# Patient Record
Sex: FEMALE | Race: BLACK OR AFRICAN AMERICAN | NOT HISPANIC OR LATINO | Employment: UNEMPLOYED | URBAN - METROPOLITAN AREA
[De-identification: names, ages, dates, MRNs, and addresses within clinical notes are randomized per-mention and may not be internally consistent; named-entity substitution may affect disease eponyms.]

---

## 2023-09-28 RX ORDER — DIPHENHYDRAMINE HCL 25 MG
1 TABLET ORAL EVERY 6 HOURS PRN
COMMUNITY

## 2023-09-28 RX ORDER — ATORVASTATIN CALCIUM 20 MG/1
1 TABLET, FILM COATED ORAL DAILY
COMMUNITY

## 2023-09-28 RX ORDER — OXYCODONE HYDROCHLORIDE 5 MG/1
1 TABLET ORAL EVERY 8 HOURS PRN
COMMUNITY

## 2023-09-28 RX ORDER — LIDOCAINE 50 MG/G
PATCH TOPICAL DAILY
COMMUNITY

## 2023-09-28 RX ORDER — AMOXICILLIN 250 MG
2 CAPSULE ORAL DAILY
COMMUNITY

## 2023-09-28 RX ORDER — CLOPIDOGREL BISULFATE 75 MG/1
1 TABLET ORAL DAILY
COMMUNITY

## 2023-09-28 RX ORDER — EPOETIN ALFA 10000 [IU]/ML
1 SOLUTION INTRAVENOUS; SUBCUTANEOUS
COMMUNITY

## 2023-09-28 RX ORDER — TERBUTALINE SULFATE 5 MG/1
1 TABLET ORAL 2 TIMES DAILY
COMMUNITY

## 2023-09-28 RX ORDER — BUDESONIDE 0.5 MG/2ML
2 INHALANT ORAL EVERY 12 HOURS
COMMUNITY

## 2023-09-28 RX ORDER — UBIDECARENONE 75 MG
1 CAPSULE ORAL DAILY
COMMUNITY

## 2023-09-28 RX ORDER — TRAZODONE HYDROCHLORIDE 50 MG/1
1.5 TABLET ORAL NIGHTLY
COMMUNITY

## 2023-09-28 RX ORDER — LORAZEPAM 1 MG/1
TABLET ORAL EVERY 6 HOURS PRN
COMMUNITY

## 2023-09-28 RX ORDER — IPRATROPIUM BROMIDE AND ALBUTEROL SULFATE 2.5; .5 MG/3ML; MG/3ML
3 SOLUTION RESPIRATORY (INHALATION) 4 TIMES DAILY
COMMUNITY

## 2023-09-28 RX ORDER — FUROSEMIDE 40 MG/1
1 TABLET ORAL DAILY
COMMUNITY

## 2023-09-28 RX ORDER — ALUMINUM HYDROXIDE, MAGNESIUM HYDROXIDE, AND SIMETHICONE 1200; 120; 1200 MG/30ML; MG/30ML; MG/30ML
30 SUSPENSION ORAL EVERY 6 HOURS PRN
COMMUNITY
Start: 2022-12-23

## 2023-09-28 RX ORDER — LEVETIRACETAM 1000 MG/1
1 TABLET ORAL 2 TIMES DAILY
COMMUNITY

## 2023-09-28 RX ORDER — HYDROXYZINE HYDROCHLORIDE 25 MG/1
1 TABLET, FILM COATED ORAL EVERY 6 HOURS PRN
COMMUNITY

## 2023-09-28 RX ORDER — LEVETIRACETAM 750 MG/1
1 TABLET ORAL 2 TIMES DAILY
COMMUNITY
Start: 2022-12-23

## 2023-09-28 RX ORDER — FAMOTIDINE 20 MG/1
1 TABLET, FILM COATED ORAL DAILY
COMMUNITY

## 2023-09-28 RX ORDER — ERGOCALCIFEROL 1.25 MG/1
1 CAPSULE ORAL
COMMUNITY

## 2023-09-28 RX ORDER — ESCITALOPRAM OXALATE 20 MG/1
1 TABLET ORAL DAILY
COMMUNITY
Start: 2022-12-23

## 2023-09-28 RX ORDER — DOCUSATE SODIUM 100 MG/1
1 CAPSULE, LIQUID FILLED ORAL 2 TIMES DAILY
COMMUNITY

## 2023-09-28 RX ORDER — METHOCARBAMOL 500 MG/1
1 TABLET, FILM COATED ORAL 3 TIMES DAILY
COMMUNITY

## 2023-09-28 RX ORDER — GABAPENTIN 300 MG/1
1 CAPSULE ORAL 3 TIMES DAILY
COMMUNITY

## 2023-09-28 RX ORDER — LISINOPRIL 20 MG/1
1 TABLET ORAL DAILY
COMMUNITY

## 2023-09-28 RX ORDER — CLONIDINE HYDROCHLORIDE 0.2 MG/1
1 TABLET ORAL EVERY 8 HOURS PRN
COMMUNITY

## 2023-09-28 RX ORDER — MECLIZINE HCL 12.5 MG 12.5 MG/1
1 TABLET ORAL 3 TIMES DAILY PRN
COMMUNITY
Start: 2022-12-23

## 2023-09-28 RX ORDER — CHLORHEXIDINE GLUCONATE ORAL RINSE 1.2 MG/ML
15 SOLUTION DENTAL 2 TIMES DAILY
COMMUNITY

## 2023-09-28 RX ORDER — POLYETHYLENE GLYCOL 3350 17 G/17G
17 POWDER, FOR SOLUTION ORAL DAILY PRN
COMMUNITY

## 2023-09-28 RX ORDER — GUAIFENESIN 600 MG/1
1 TABLET, EXTENDED RELEASE ORAL EVERY 12 HOURS PRN
COMMUNITY

## 2023-09-28 RX ORDER — IBUPROFEN 600 MG/1
1 TABLET ORAL EVERY 6 HOURS PRN
COMMUNITY

## 2023-09-28 RX ORDER — MIDODRINE HYDROCHLORIDE 5 MG/1
1 TABLET ORAL 3 TIMES DAILY
COMMUNITY
Start: 2022-12-23

## 2023-09-28 RX ORDER — ACETAMINOPHEN 325 MG/1
2 TABLET ORAL EVERY 6 HOURS PRN
COMMUNITY
Start: 2022-12-23

## 2023-10-04 ENCOUNTER — APPOINTMENT (OUTPATIENT)
Dept: PULMONOLOGY | Facility: HOSPITAL | Age: 48
End: 2023-10-04
Payer: COMMERCIAL

## 2024-02-23 ENCOUNTER — APPOINTMENT (OUTPATIENT)
Dept: CARDIOLOGY | Facility: HOSPITAL | Age: 49
End: 2024-02-23
Payer: COMMERCIAL

## 2024-02-23 ENCOUNTER — APPOINTMENT (OUTPATIENT)
Dept: RADIOLOGY | Facility: HOSPITAL | Age: 49
End: 2024-02-23
Payer: COMMERCIAL

## 2024-02-23 ENCOUNTER — HOSPITAL ENCOUNTER (EMERGENCY)
Facility: HOSPITAL | Age: 49
Discharge: HOME | End: 2024-02-23
Attending: EMERGENCY MEDICINE
Payer: COMMERCIAL

## 2024-02-23 VITALS
DIASTOLIC BLOOD PRESSURE: 94 MMHG | OXYGEN SATURATION: 98 % | HEART RATE: 74 BPM | RESPIRATION RATE: 18 BRPM | HEIGHT: 67 IN | SYSTOLIC BLOOD PRESSURE: 164 MMHG | TEMPERATURE: 98.8 F | BODY MASS INDEX: 45.99 KG/M2 | WEIGHT: 293 LBS

## 2024-02-23 DIAGNOSIS — I47.10 SVT (SUPRAVENTRICULAR TACHYCARDIA) (CMS-HCC): Primary | ICD-10-CM

## 2024-02-23 LAB
ALBUMIN SERPL BCP-MCNC: 3.4 G/DL (ref 3.4–5)
ALP SERPL-CCNC: 55 U/L (ref 33–110)
ALT SERPL W P-5'-P-CCNC: 5 U/L (ref 7–45)
ANION GAP BLDV CALCULATED.4IONS-SCNC: 4 MMOL/L (ref 10–25)
ANION GAP SERPL CALC-SCNC: 12 MMOL/L (ref 10–20)
APTT PPP: 37 SECONDS (ref 27–38)
AST SERPL W P-5'-P-CCNC: 11 U/L (ref 9–39)
BASE EXCESS BLDV CALC-SCNC: 9.4 MMOL/L (ref -2–3)
BASOPHILS # BLD AUTO: 0.03 X10*3/UL (ref 0–0.1)
BASOPHILS NFR BLD AUTO: 0.4 %
BILIRUB SERPL-MCNC: 0.4 MG/DL (ref 0–1.2)
BNP SERPL-MCNC: 59 PG/ML (ref 0–99)
BODY TEMPERATURE: 37 DEGREES CELSIUS
BUN SERPL-MCNC: 11 MG/DL (ref 6–23)
CA-I BLDV-SCNC: 1.39 MMOL/L (ref 1.1–1.33)
CALCIUM SERPL-MCNC: 10.4 MG/DL (ref 8.6–10.3)
CARDIAC TROPONIN I PNL SERPL HS: 24 NG/L (ref 0–13)
CARDIAC TROPONIN I PNL SERPL HS: 27 NG/L (ref 0–13)
CHLORIDE BLDV-SCNC: 103 MMOL/L (ref 98–107)
CHLORIDE SERPL-SCNC: 100 MMOL/L (ref 98–107)
CO2 SERPL-SCNC: 31 MMOL/L (ref 21–32)
CREAT SERPL-MCNC: 0.83 MG/DL (ref 0.5–1.05)
EGFRCR SERPLBLD CKD-EPI 2021: 88 ML/MIN/1.73M*2
EOSINOPHIL # BLD AUTO: 0.23 X10*3/UL (ref 0–0.7)
EOSINOPHIL NFR BLD AUTO: 3.4 %
ERYTHROCYTE [DISTWIDTH] IN BLOOD BY AUTOMATED COUNT: 16.6 % (ref 11.5–14.5)
FLUAV RNA RESP QL NAA+PROBE: NOT DETECTED
FLUBV RNA RESP QL NAA+PROBE: NOT DETECTED
GLUCOSE BLDV-MCNC: 116 MG/DL (ref 74–99)
GLUCOSE SERPL-MCNC: 111 MG/DL (ref 74–99)
HCO3 BLDV-SCNC: 36.3 MMOL/L (ref 22–26)
HCT VFR BLD AUTO: 40.1 % (ref 36–46)
HCT VFR BLD EST: 36 % (ref 36–46)
HGB BLD-MCNC: 11.7 G/DL (ref 12–16)
HGB BLDV-MCNC: 12 G/DL (ref 12–16)
IMM GRANULOCYTES # BLD AUTO: 0.04 X10*3/UL (ref 0–0.7)
IMM GRANULOCYTES NFR BLD AUTO: 0.6 % (ref 0–0.9)
INHALED O2 CONCENTRATION: 40 %
INR PPP: 1 (ref 0.9–1.1)
LACTATE BLDV-SCNC: 0.9 MMOL/L (ref 0.4–2)
LACTATE SERPL-SCNC: 0.7 MMOL/L (ref 0.4–2)
LYMPHOCYTES # BLD AUTO: 1.95 X10*3/UL (ref 1.2–4.8)
LYMPHOCYTES NFR BLD AUTO: 28.8 %
MAGNESIUM SERPL-MCNC: 1.9 MG/DL (ref 1.6–2.4)
MCH RBC QN AUTO: 21.5 PG (ref 26–34)
MCHC RBC AUTO-ENTMCNC: 29.2 G/DL (ref 32–36)
MCV RBC AUTO: 74 FL (ref 80–100)
MONOCYTES # BLD AUTO: 0.36 X10*3/UL (ref 0.1–1)
MONOCYTES NFR BLD AUTO: 5.3 %
NEUTROPHILS # BLD AUTO: 4.16 X10*3/UL (ref 1.2–7.7)
NEUTROPHILS NFR BLD AUTO: 61.5 %
NRBC BLD-RTO: 0 /100 WBCS (ref 0–0)
OXYHGB MFR BLDV: 65.2 % (ref 45–75)
PCO2 BLDV: 60 MM HG (ref 41–51)
PH BLDV: 7.39 PH (ref 7.33–7.43)
PLATELET # BLD AUTO: 293 X10*3/UL (ref 150–450)
PO2 BLDV: 43 MM HG (ref 35–45)
POTASSIUM BLDV-SCNC: 3.9 MMOL/L (ref 3.5–5.3)
POTASSIUM SERPL-SCNC: 4.4 MMOL/L (ref 3.5–5.3)
PROT SERPL-MCNC: 8.4 G/DL (ref 6.4–8.2)
PROTHROMBIN TIME: 11.6 SECONDS (ref 9.8–12.8)
RBC # BLD AUTO: 5.44 X10*6/UL (ref 4–5.2)
SAO2 % BLDV: 67 % (ref 45–75)
SARS-COV-2 RNA RESP QL NAA+PROBE: NOT DETECTED
SODIUM BLDV-SCNC: 139 MMOL/L (ref 136–145)
SODIUM SERPL-SCNC: 139 MMOL/L (ref 136–145)
TSH SERPL-ACNC: 0.81 MIU/L (ref 0.44–3.98)
WBC # BLD AUTO: 6.8 X10*3/UL (ref 4.4–11.3)

## 2024-02-23 PROCEDURE — 87040 BLOOD CULTURE FOR BACTERIA: CPT | Mod: AHULAB | Performed by: EMERGENCY MEDICINE

## 2024-02-23 PROCEDURE — 71045 X-RAY EXAM CHEST 1 VIEW: CPT | Mod: FOREIGN READ | Performed by: RADIOLOGY

## 2024-02-23 PROCEDURE — 85610 PROTHROMBIN TIME: CPT | Performed by: EMERGENCY MEDICINE

## 2024-02-23 PROCEDURE — 84443 ASSAY THYROID STIM HORMONE: CPT | Performed by: EMERGENCY MEDICINE

## 2024-02-23 PROCEDURE — 87636 SARSCOV2 & INF A&B AMP PRB: CPT | Performed by: EMERGENCY MEDICINE

## 2024-02-23 PROCEDURE — 93005 ELECTROCARDIOGRAM TRACING: CPT

## 2024-02-23 PROCEDURE — 2500000005 HC RX 250 GENERAL PHARMACY W/O HCPCS: Performed by: EMERGENCY MEDICINE

## 2024-02-23 PROCEDURE — 93005 ELECTROCARDIOGRAM TRACING: CPT | Mod: 59

## 2024-02-23 PROCEDURE — 71045 X-RAY EXAM CHEST 1 VIEW: CPT

## 2024-02-23 PROCEDURE — 94681 O2 UPTK CO2 OUTP % O2 XTRC: CPT

## 2024-02-23 PROCEDURE — 84132 ASSAY OF SERUM POTASSIUM: CPT | Performed by: EMERGENCY MEDICINE

## 2024-02-23 PROCEDURE — 96374 THER/PROPH/DIAG INJ IV PUSH: CPT

## 2024-02-23 PROCEDURE — 2500000004 HC RX 250 GENERAL PHARMACY W/ HCPCS (ALT 636 FOR OP/ED): Performed by: EMERGENCY MEDICINE

## 2024-02-23 PROCEDURE — 84484 ASSAY OF TROPONIN QUANT: CPT | Mod: 91 | Performed by: EMERGENCY MEDICINE

## 2024-02-23 PROCEDURE — 94002 VENT MGMT INPAT INIT DAY: CPT | Mod: CCI

## 2024-02-23 PROCEDURE — 99291 CRITICAL CARE FIRST HOUR: CPT | Performed by: EMERGENCY MEDICINE

## 2024-02-23 PROCEDURE — 85025 COMPLETE CBC W/AUTO DIFF WBC: CPT | Performed by: EMERGENCY MEDICINE

## 2024-02-23 PROCEDURE — 84484 ASSAY OF TROPONIN QUANT: CPT | Performed by: EMERGENCY MEDICINE

## 2024-02-23 PROCEDURE — 99285 EMERGENCY DEPT VISIT HI MDM: CPT | Mod: 25

## 2024-02-23 PROCEDURE — 83735 ASSAY OF MAGNESIUM: CPT | Performed by: EMERGENCY MEDICINE

## 2024-02-23 PROCEDURE — 36415 COLL VENOUS BLD VENIPUNCTURE: CPT | Performed by: EMERGENCY MEDICINE

## 2024-02-23 PROCEDURE — 83605 ASSAY OF LACTIC ACID: CPT | Mod: 91 | Performed by: EMERGENCY MEDICINE

## 2024-02-23 PROCEDURE — 83880 ASSAY OF NATRIURETIC PEPTIDE: CPT | Performed by: EMERGENCY MEDICINE

## 2024-02-23 PROCEDURE — 85730 THROMBOPLASTIN TIME PARTIAL: CPT | Performed by: EMERGENCY MEDICINE

## 2024-02-23 RX ORDER — DILTIAZEM HYDROCHLORIDE 5 MG/ML
20 INJECTION INTRAVENOUS ONCE
Status: DISCONTINUED | OUTPATIENT
Start: 2024-02-23 | End: 2024-02-23

## 2024-02-23 RX ORDER — ADENOSINE 3 MG/ML
12 INJECTION, SOLUTION INTRAVENOUS ONCE
Status: DISCONTINUED | OUTPATIENT
Start: 2024-02-23 | End: 2024-02-23

## 2024-02-23 RX ORDER — ADENOSINE 3 MG/ML
6 INJECTION, SOLUTION INTRAVENOUS ONCE
Status: COMPLETED | OUTPATIENT
Start: 2024-02-23 | End: 2024-02-23

## 2024-02-23 RX ORDER — ADENOSINE 3 MG/ML
12 INJECTION, SOLUTION INTRAVENOUS ONCE
Status: COMPLETED | OUTPATIENT
Start: 2024-02-23 | End: 2024-02-23

## 2024-02-23 RX ADMIN — Medication 40 PERCENT: at 05:40

## 2024-02-23 RX ADMIN — ADENOSINE 12 MG: 3 INJECTION, SOLUTION INTRAVENOUS at 05:40

## 2024-02-23 RX ADMIN — ADENOSINE 6 MG: 3 INJECTION, SOLUTION INTRAVENOUS at 05:48

## 2024-02-23 ASSESSMENT — PAIN SCALES - GENERAL: PAINLEVEL_OUTOF10: 0 - NO PAIN

## 2024-02-23 ASSESSMENT — ACTIVITIES OF DAILY LIVING (ADL): LACK_OF_TRANSPORTATION: NO

## 2024-02-23 ASSESSMENT — COLUMBIA-SUICIDE SEVERITY RATING SCALE - C-SSRS
1. IN THE PAST MONTH, HAVE YOU WISHED YOU WERE DEAD OR WISHED YOU COULD GO TO SLEEP AND NOT WAKE UP?: NO
2. HAVE YOU ACTUALLY HAD ANY THOUGHTS OF KILLING YOURSELF?: NO
6. HAVE YOU EVER DONE ANYTHING, STARTED TO DO ANYTHING, OR PREPARED TO DO ANYTHING TO END YOUR LIFE?: NO

## 2024-02-23 NOTE — ED NOTES
RN assumed care of pt at this time. Pt resting in bed denies complaints, full set of vital signs obtained.     Damon Cristina RN  02/23/24 0993

## 2024-02-23 NOTE — PROGRESS NOTES
Transitional Care Coordination Progress Note:  Plan per Medical/Surgical team: treatment of SVT with adenosine. HR improved from 174 to 63  Status: ED  [facility]: [facility]  Discharge disposition: [place] [facility] [phone]   Potential Barriers: obese, trach on vent  ADOD: 2/23/2024  HEATHER Hernandez RN, BSN Transitional Care Coordinator ED# [phone]      02/23/24 1049   Discharge Planning   Living Arrangements Alone   Support Systems Parent   Assistance Needed trach care, cardio work up   Type of Residence Skilled nursing facility   Do you have animals or pets at home? No   Home or Post Acute Services Post acute facilities (Rehab/SNF/etc)   Type of Post Acute Facility Services Skilled nursing   Patient expects to be discharged to: [facility]   Does the patient need discharge transport arranged? Yes   RoundTrip coordination needed? Yes   Has discharge transport been arranged? No   Financial Resource Strain   How hard is it for you to pay for the very basics like food, housing, medical care, and heating? Not hard   Housing Stability   In the last 12 months, was there a time when you were not able to pay the mortgage or rent on time? N   In the last 12 months, how many places have you lived? 1   In the last 12 months, was there a time when you did not have a steady place to sleep or slept in a shelter (including now)? N   Transportation Needs   In the past 12 months, has lack of transportation kept you from medical appointments or from getting medications? no   In the past 12 months, has lack of transportation kept you from meetings, work, or from getting things needed for daily living? No

## 2024-02-23 NOTE — ED NOTES
Pt placed in hospital bed for comfort. Mel care completed at this time, external catheter placed for urine collection. Pt tolerated move well. Deep suctioning completed per RN after move. Pt placed at a 45 degree angle call light in hand, pt denies requests at this time.      Damon Cristina RN  02/23/24 8262

## 2024-02-23 NOTE — ED NOTES
Patient report called to Gabe bedside nurse providing care oat [facility].      Damon Cristina, RN  02/23/24 2859

## 2024-02-23 NOTE — ED NOTES
The PT arrived at the ED by squad in SVT the PT 's HR was 178, she was placed on the defibrillator to track her rhythm.  The provider decided to administer Adenosine to correct the HR. At 0548 6mg of adenosine was administered with no effect. The provider then increased the dose to 12mg of adenosine at 0551 and had a positive result, with her HR was decreased to 80 BPM. The PT continues to be monitored by the defibrillator for  for monitoring incase of needed intervention.      Lc Valderrama RN  02/23/24 5746

## 2024-02-23 NOTE — ED NOTES
Assumed patient care @ 0720, pt resting in bed, placed on cardiac monitor. EKG obtained per-order. Pt trached & on vent. Denies any pain at this time     Beulah Jimenez RN  02/23/24 7948

## 2024-02-23 NOTE — ED PROVIDER NOTES
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  She is unable to walk.  She is vent trach dependent.  She denies any complaints.  She is alert and oriented x 3.  She is able to speak with the Passy-Nancy valve.  Nurses noted that she was tachycardic.  She had no prior history of any cardiac dysfunction.  EMS noted that she had a slight temperature of 99.                  Fatmata Coma Scale Score: 15                     Patient History   No past medical history on file.  No past surgical history on file.  No family history on file.  Social History     Tobacco Use    Smoking status: Not on file    Smokeless tobacco: Not on file   Substance Use Topics    Alcohol use: Not on file    Drug use: Not on file       Physical Exam   ED Triage Vitals   Temp Pulse Resp BP   -- -- -- --      SpO2 Temp src Heart Rate Source Patient Position   -- -- -- --      BP Location FiO2 (%)     -- --       Physical Exam  Vitals and nursing note reviewed.   Constitutional:       General: She is not in acute distress.     Appearance: She is well-developed. She is obese.   HENT:      Head: Normocephalic and atraumatic.   Eyes:      Conjunctiva/sclera: Conjunctivae normal.   Cardiovascular:      Rate and Rhythm: Normal rate and regular rhythm.      Heart sounds: No murmur heard.  Pulmonary:      Effort: Pulmonary effort is normal. No respiratory distress.      Breath sounds: Normal breath sounds.   Abdominal:      Palpations: Abdomen is soft.      Tenderness: There is no abdominal tenderness.   Musculoskeletal:         General: No swelling.      Cervical back: Neck supple.   Skin:     General: Skin is warm and dry.      Capillary Refill: Capillary refill takes less than 2 seconds.      Comments: Some excoriations across patient's chest and abdomen in the left breast largest which is 1 cm, no surrounding erythema, trach site  clean dry intact   Neurological:      Mental Status: She is alert.   Psychiatric:         Mood and Affect: Mood normal.         ED Course & MDM   Diagnoses as of 02/24/24 2309   SVT (supraventricular tachycardia)       Medical Decision Making  Considered sepsis with borderline temperature and tachycardia.  She has no prior cardiac disease in the past.  The patient is unremarkable labs she may be able to return to her facility.  Laboratory data was pending at time of signout to oncoming provider.  Patient did convert with adenosine second dose 6 mg and then 12 mg.    EKG interpreted by myself.  Patient's initial EKG at 0 538 shows SVT at a rate of 176.  Normal intervals.  Normal axis.  No signs of acute ischemia.      Repeat EKG EKG interpreted by myself.  Normal sinus rhythm at a rate of 75 bpm.  Normal intervals.  Normal axis.  No signs of acute ischemia.      Procedure  Critical Care    Performed by: Shaheed Quintero MD  Authorized by: Shaheed Quintero MD    Critical care provider statement:     Critical care time (minutes):  35    Critical care time was exclusive of:  Separately billable procedures and treating other patients    Critical care was necessary to treat or prevent imminent or life-threatening deterioration of the following conditions:  Cardiac failure    Critical care was time spent personally by me on the following activities:  Ventilator management, blood draw for specimens, ordering and performing treatments and interventions, development of treatment plan with patient or surrogate, ordering and review of laboratory studies, ordering and review of radiographic studies, pulse oximetry, evaluation of patient's response to treatment and re-evaluation of patient's condition       Shaheed Quintero MD  02/23/24 0605       Shaheed Quintero MD  02/23/24 0610       Shaheed Quintero MD  02/24/24 5842

## 2024-02-23 NOTE — PROGRESS NOTES
Emergency Medicine Transition of Care Note.    I received Nedra Connelly in signout from Dr. Quintero.  Please see the previous ED provider note for all HPI, PE and MDM up to the time of signout at 6:00. This is in addition to the primary record.    In brief Nedra Connelly is an 48 y.o. female presenting for   Chief Complaint   Patient presents with    Rapid Heart Rate     At the time of signout we were awaiting: Labs  Labs Reviewed   CBC WITH AUTO DIFFERENTIAL - Abnormal       Result Value    WBC 6.8      nRBC 0.0      RBC 5.44 (*)     Hemoglobin 11.7 (*)     Hematocrit 40.1      MCV 74 (*)     MCH 21.5 (*)     MCHC 29.2 (*)     RDW 16.6 (*)     Platelets 293      Neutrophils % 61.5      Immature Granulocytes %, Automated 0.6      Lymphocytes % 28.8      Monocytes % 5.3      Eosinophils % 3.4      Basophils % 0.4      Neutrophils Absolute 4.16      Immature Granulocytes Absolute, Automated 0.04      Lymphocytes Absolute 1.95      Monocytes Absolute 0.36      Eosinophils Absolute 0.23      Basophils Absolute 0.03     COMPREHENSIVE METABOLIC PANEL - Abnormal    Glucose 111 (*)     Sodium 139      Potassium 4.4      Chloride 100      Bicarbonate 31      Anion Gap 12      Urea Nitrogen 11      Creatinine 0.83      eGFR 88      Calcium 10.4 (*)     Albumin 3.4      Alkaline Phosphatase 55      Total Protein 8.4 (*)     AST 11      Bilirubin, Total 0.4      ALT 5 (*)    SERIAL TROPONIN-INITIAL - Abnormal    Troponin I, High Sensitivity 27 (*)     Narrative:     Less than 99th percentile of normal range cutoff-  Female and children under 18 years old <14 ng/L; Male <21 ng/L: Negative  Repeat testing should be performed if clinically indicated.     Female and children under 18 years old 14-50 ng/L; Male 21-50 ng/L:  Consistent with possible cardiac damage and possible increased clinical   risk. Serial measurements may help to assess extent of myocardial damage.     >50 ng/L: Consistent with cardiac damage, increased  clinical risk and  myocardial infarction. Serial measurements may help assess extent of   myocardial damage.      NOTE: Children less than 1 year old may have higher baseline troponin   levels and results should be interpreted in conjunction with the overall   clinical context.     NOTE: Troponin I testing is performed using a different   testing methodology at [facility] than at other   [facility]. Direct result comparisons should only   be made within the same method.   BLOOD GAS VENOUS FULL PANEL - Abnormal    POCT pH, Venous 7.39      POCT pCO2, Venous 60 (*)     POCT pO2, Venous 43      POCT SO2, Venous 67      POCT Oxy Hemoglobin, Venous 65.2      POCT Hematocrit Calculated, Venous 36.0      POCT Sodium, Venous 139      POCT Potassium, Venous 3.9      POCT Chloride, Venous 103      POCT Ionized Calicum, Venous 1.39 (*)     POCT Glucose, Venous 116 (*)     POCT Lactate, Venous 0.9      POCT Base Excess, Venous 9.4 (*)     POCT HCO3 Calculated, Venous 36.3 (*)     POCT Hemoglobin, Venous 12.0      POCT Anion Gap, Venous 4.0 (*)     Patient Temperature 37.0      FiO2 40     SERIAL TROPONIN, 1 HOUR - Abnormal    Troponin I, High Sensitivity 24 (*)     Narrative:     Less than 99th percentile of normal range cutoff-  Female and children under 18 years old <14 ng/L; Male <21 ng/L: Negative  Repeat testing should be performed if clinically indicated.     Female and children under 18 years old 14-50 ng/L; Male 21-50 ng/L:  Consistent with possible cardiac damage and possible increased clinical   risk. Serial measurements may help to assess extent of myocardial damage.     >50 ng/L: Consistent with cardiac damage, increased clinical risk and  myocardial infarction. Serial measurements may help assess extent of   myocardial damage.      NOTE: Children less than 1 year old may have higher baseline troponin   levels and results should be interpreted in conjunction with the overall   clinical context.      NOTE: Troponin I testing is performed using a different   testing methodology at [facility]] than at other   system [facility]. Direct result comparisons should only   be made within the same method.   BLOOD CULTURE - Normal    Blood Culture Loaded on Instrument - Culture in progress     BLOOD CULTURE - Normal    Blood Culture Loaded on Instrument - Culture in progress     TSH WITH REFLEX TO FREE T4 IF ABNORMAL - Normal    Thyroid Stimulating Hormone 0.81      Narrative:     TSH testing is performed using different testing methodology at [facility]] than at other [facility]. Direct result comparisons should only be made within the same method.     MAGNESIUM - Normal    Magnesium 1.90     APTT - Normal    aPTT 37      Narrative:     The APTT is no longer used for monitoring Unfractionated Heparin Therapy. For monitoring Heparin Therapy, use the Heparin Assay.   PROTIME-INR - Normal    Protime 11.6      INR 1.0     B-TYPE NATRIURETIC PEPTIDE - Normal    BNP 59      Narrative:        <100 pg/mL - Heart failure unlikely  100-299 pg/mL - Intermediate probability of acute heart                  failure exacerbation. Correlate with clinical                  context and patient history.    >=300 pg/mL - Heart Failure likely. Correlate with clinical                  context and patient history.    BNP testing is performed using different testing methodology at [facility]] than at other [facility]. Direct result comparisons should only be made within the same method.      SARS-COV-2 AND INFLUENZA A/B PCR - Normal    Flu A Result Not Detected      Flu B Result Not Detected      Coronavirus 2019, PCR Not Detected      Narrative:     This assay has received FDA Emergency Use Authorization (EUA) and  is only authorized for the duration of time that circumstances exist to justify the authorization of the emergency use of in vitro diagnostic tests for the detection of SARS-CoV-2  virus and/or diagnosis of COVID-19 infection under section 564(b)(1) of the Act, 21 U.S.C. 360bbb-3(b)(1). Testing for SARS-CoV-2 is only recommended for patients who meet current clinical and/or epidemiological criteria as defined by federal, state, or local public health directives. This assay is an in vitro diagnostic nucleic acid amplification test for the qualitative detection of SARS-CoV-2, Influenza A, and Influenza B from nasopharyngeal specimens and has been validated for use at [facility]. Negative results do not preclude COVID-19 infections or Influenza A/B infections, and should not be used as the sole basis for diagnosis, treatment, or other management decisions. If Influenza A/B and RSV PCR results are negative, testing for Parainfluenza virus, Adenovirus and Metapneumovirus is routinely performed for [facility] pediatric oncology and intensive care inpatients, and is available on other patients by placing an add-on request.    LACTATE - Normal    Lactate 0.7      Narrative:     Venipuncture immediately after or during the administration of Metamizole may lead to falsely low results. Testing should be performed immediately  prior to Metamizole dosing.   TROPONIN SERIES- (INITIAL, 1 HR)    Narrative:     The following orders were created for panel order Troponin I Series, High Sensitivity (0, 1 HR).  Procedure                               Abnormality         Status                     ---------                               -----------         ------                     Troponin I, High Sensiti...[[id number]]  Abnormal            Final result               Troponin, High Sensitivi...[[id number]]  Abnormal            Final result                 Please view results for these tests on the individual orders.        XR chest 1 view   Final Result   Global cardiomegaly. Low lung volumes but no evidence of infiltrate or   edema. Tracheostomy tube in place.   Signed by Lc Higgins MD              Diagnoses as of 02/23/24 2310   SVT (supraventricular tachycardia)       Medical Decision Making  Labs reviewed and are similar to prior labs.  Following adenosine patient's heart rate has been in the 80s and currently in the 60s.  She has no signs of infection.  Troponins are flat.  Case discussed with Dr. Carolina and rosa to send back to facility.  Patient feels like she is at her baseline and is agreeable with plan to discharge.    Final diagnoses:   [I47.10] SVT (supraventricular tachycardia)           Procedure  Procedures    Makayla Costello MD

## 2024-02-24 LAB
Q ONSET: 220 MS
QRS COUNT: 29 BEATS
QRS DURATION: 82 MS
QT INTERVAL: 266 MS
QTC CALCULATION(BAZETT): 455 MS
QTC FREDERICIA: 380 MS
R AXIS: 44 DEGREES
T AXIS: 234 DEGREES
T OFFSET: 353 MS
VENTRICULAR RATE: 176 BPM

## 2024-02-27 LAB
BACTERIA BLD CULT: NORMAL
BACTERIA BLD CULT: NORMAL

## 2024-05-11 ENCOUNTER — APPOINTMENT (OUTPATIENT)
Dept: RADIOLOGY | Facility: HOSPITAL | Age: 49
End: 2024-05-11
Payer: COMMERCIAL

## 2024-05-11 ENCOUNTER — APPOINTMENT (OUTPATIENT)
Dept: CARDIOLOGY | Facility: HOSPITAL | Age: 49
End: 2024-05-11
Payer: COMMERCIAL

## 2024-05-11 ENCOUNTER — HOSPITAL ENCOUNTER (EMERGENCY)
Facility: HOSPITAL | Age: 49
Discharge: HOME | End: 2024-05-12
Attending: EMERGENCY MEDICINE
Payer: COMMERCIAL

## 2024-05-11 DIAGNOSIS — R00.0 TACHYARRHYTHMIA: Primary | ICD-10-CM

## 2024-05-11 LAB
ALBUMIN SERPL BCP-MCNC: 3.3 G/DL (ref 3.4–5)
ALP SERPL-CCNC: 59 U/L (ref 33–110)
ALT SERPL W P-5'-P-CCNC: 6 U/L (ref 7–45)
ANION GAP BLDV CALCULATED.4IONS-SCNC: 7 MMOL/L (ref 10–25)
ANION GAP SERPL CALC-SCNC: 11 MMOL/L (ref 10–20)
AST SERPL W P-5'-P-CCNC: 9 U/L (ref 9–39)
BASE EXCESS BLDV CALC-SCNC: 6.1 MMOL/L (ref -2–3)
BASOPHILS # BLD AUTO: 0.03 X10*3/UL (ref 0–0.1)
BASOPHILS NFR BLD AUTO: 0.3 %
BILIRUB SERPL-MCNC: 0.3 MG/DL (ref 0–1.2)
BNP SERPL-MCNC: 56 PG/ML (ref 0–99)
BODY TEMPERATURE: 37 DEGREES CELSIUS
BUN SERPL-MCNC: 13 MG/DL (ref 6–23)
CA-I BLDV-SCNC: 1.37 MMOL/L (ref 1.1–1.33)
CALCIUM SERPL-MCNC: 9.2 MG/DL (ref 8.6–10.3)
CARDIAC TROPONIN I PNL SERPL HS: 25 NG/L (ref 0–13)
CHLORIDE BLDV-SCNC: 104 MMOL/L (ref 98–107)
CHLORIDE SERPL-SCNC: 103 MMOL/L (ref 98–107)
CO2 SERPL-SCNC: 29 MMOL/L (ref 21–32)
CREAT SERPL-MCNC: 0.93 MG/DL (ref 0.5–1.05)
EGFRCR SERPLBLD CKD-EPI 2021: 76 ML/MIN/1.73M*2
EOSINOPHIL # BLD AUTO: 0.33 X10*3/UL (ref 0–0.7)
EOSINOPHIL NFR BLD AUTO: 3.6 %
ERYTHROCYTE [DISTWIDTH] IN BLOOD BY AUTOMATED COUNT: 17.9 % (ref 11.5–14.5)
FLUAV RNA RESP QL NAA+PROBE: NOT DETECTED
FLUBV RNA RESP QL NAA+PROBE: NOT DETECTED
GLUCOSE BLDV-MCNC: 148 MG/DL (ref 74–99)
GLUCOSE SERPL-MCNC: 146 MG/DL (ref 74–99)
HCO3 BLDV-SCNC: 32.1 MMOL/L (ref 22–26)
HCT VFR BLD AUTO: 35.7 % (ref 36–46)
HCT VFR BLD EST: 31 % (ref 36–46)
HGB BLD-MCNC: 10.4 G/DL (ref 12–16)
HGB BLDV-MCNC: 10.3 G/DL (ref 12–16)
IMM GRANULOCYTES # BLD AUTO: 0.03 X10*3/UL (ref 0–0.7)
IMM GRANULOCYTES NFR BLD AUTO: 0.3 % (ref 0–0.9)
INHALED O2 CONCENTRATION: 30 %
LACTATE BLDV-SCNC: 1.9 MMOL/L (ref 0.4–2)
LYMPHOCYTES # BLD AUTO: 1.94 X10*3/UL (ref 1.2–4.8)
LYMPHOCYTES NFR BLD AUTO: 21.4 %
MAGNESIUM SERPL-MCNC: 1.7 MG/DL (ref 1.6–2.4)
MCH RBC QN AUTO: 21.9 PG (ref 26–34)
MCHC RBC AUTO-ENTMCNC: 29.1 G/DL (ref 32–36)
MCV RBC AUTO: 75 FL (ref 80–100)
MONOCYTES # BLD AUTO: 0.62 X10*3/UL (ref 0.1–1)
MONOCYTES NFR BLD AUTO: 6.8 %
NEUTROPHILS # BLD AUTO: 6.11 X10*3/UL (ref 1.2–7.7)
NEUTROPHILS NFR BLD AUTO: 67.6 %
NRBC BLD-RTO: 0 /100 WBCS (ref 0–0)
OXYHGB MFR BLDV: 93.9 % (ref 45–75)
PCO2 BLDV: 53 MM HG (ref 41–51)
PH BLDV: 7.39 PH (ref 7.33–7.43)
PLATELET # BLD AUTO: 225 X10*3/UL (ref 150–450)
PO2 BLDV: 75 MM HG (ref 35–45)
POTASSIUM BLDV-SCNC: 3.9 MMOL/L (ref 3.5–5.3)
POTASSIUM SERPL-SCNC: 3.5 MMOL/L (ref 3.5–5.3)
PROT SERPL-MCNC: 7.2 G/DL (ref 6.4–8.2)
RBC # BLD AUTO: 4.75 X10*6/UL (ref 4–5.2)
SAO2 % BLDV: 97 % (ref 45–75)
SARS-COV-2 RNA RESP QL NAA+PROBE: NOT DETECTED
SODIUM BLDV-SCNC: 139 MMOL/L (ref 136–145)
SODIUM SERPL-SCNC: 139 MMOL/L (ref 136–145)
WBC # BLD AUTO: 9.1 X10*3/UL (ref 4.4–11.3)

## 2024-05-11 PROCEDURE — 87636 SARSCOV2 & INF A&B AMP PRB: CPT | Performed by: EMERGENCY MEDICINE

## 2024-05-11 PROCEDURE — 2500000001 HC RX 250 WO HCPCS SELF ADMINISTERED DRUGS (ALT 637 FOR MEDICARE OP): Performed by: EMERGENCY MEDICINE

## 2024-05-11 PROCEDURE — 94002 VENT MGMT INPAT INIT DAY: CPT

## 2024-05-11 PROCEDURE — 93005 ELECTROCARDIOGRAM TRACING: CPT

## 2024-05-11 PROCEDURE — 84132 ASSAY OF SERUM POTASSIUM: CPT | Performed by: EMERGENCY MEDICINE

## 2024-05-11 PROCEDURE — 71045 X-RAY EXAM CHEST 1 VIEW: CPT | Performed by: RADIOLOGY

## 2024-05-11 PROCEDURE — 99284 EMERGENCY DEPT VISIT MOD MDM: CPT | Mod: 25

## 2024-05-11 PROCEDURE — 36415 COLL VENOUS BLD VENIPUNCTURE: CPT | Performed by: EMERGENCY MEDICINE

## 2024-05-11 PROCEDURE — 96365 THER/PROPH/DIAG IV INF INIT: CPT

## 2024-05-11 PROCEDURE — 84484 ASSAY OF TROPONIN QUANT: CPT | Performed by: EMERGENCY MEDICINE

## 2024-05-11 PROCEDURE — 85025 COMPLETE CBC W/AUTO DIFF WBC: CPT | Performed by: EMERGENCY MEDICINE

## 2024-05-11 PROCEDURE — 93971 EXTREMITY STUDY: CPT

## 2024-05-11 PROCEDURE — 71045 X-RAY EXAM CHEST 1 VIEW: CPT

## 2024-05-11 PROCEDURE — 83880 ASSAY OF NATRIURETIC PEPTIDE: CPT | Performed by: EMERGENCY MEDICINE

## 2024-05-11 PROCEDURE — 83735 ASSAY OF MAGNESIUM: CPT | Performed by: EMERGENCY MEDICINE

## 2024-05-11 RX ORDER — OXYCODONE HYDROCHLORIDE 5 MG/1
5 TABLET ORAL ONCE
Status: COMPLETED | OUTPATIENT
Start: 2024-05-11 | End: 2024-05-11

## 2024-05-11 RX ADMIN — OXYCODONE HYDROCHLORIDE 5 MG: 5 TABLET ORAL at 22:42

## 2024-05-11 ASSESSMENT — PAIN DESCRIPTION - LOCATION: LOCATION: LEG

## 2024-05-11 ASSESSMENT — PAIN SCALES - GENERAL
PAINLEVEL_OUTOF10: 7
PAINLEVEL_OUTOF10: 0 - NO PAIN

## 2024-05-11 ASSESSMENT — PAIN DESCRIPTION - ORIENTATION: ORIENTATION: LEFT

## 2024-05-11 ASSESSMENT — COLUMBIA-SUICIDE SEVERITY RATING SCALE - C-SSRS
2. HAVE YOU ACTUALLY HAD ANY THOUGHTS OF KILLING YOURSELF?: NO
6. HAVE YOU EVER DONE ANYTHING, STARTED TO DO ANYTHING, OR PREPARED TO DO ANYTHING TO END YOUR LIFE?: NO
1. IN THE PAST MONTH, HAVE YOU WISHED YOU WERE DEAD OR WISHED YOU COULD GO TO SLEEP AND NOT WAKE UP?: NO

## 2024-05-11 ASSESSMENT — PAIN - FUNCTIONAL ASSESSMENT: PAIN_FUNCTIONAL_ASSESSMENT: 0-10

## 2024-05-12 VITALS
DIASTOLIC BLOOD PRESSURE: 60 MMHG | BODY MASS INDEX: 70.48 KG/M2 | TEMPERATURE: 98.7 F | WEIGHT: 293 LBS | RESPIRATION RATE: 14 BRPM | HEART RATE: 68 BPM | OXYGEN SATURATION: 100 % | SYSTOLIC BLOOD PRESSURE: 140 MMHG

## 2024-05-12 PROCEDURE — 96365 THER/PROPH/DIAG IV INF INIT: CPT

## 2024-05-12 PROCEDURE — 2500000004 HC RX 250 GENERAL PHARMACY W/ HCPCS (ALT 636 FOR OP/ED): Performed by: EMERGENCY MEDICINE

## 2024-05-12 RX ADMIN — PIPERACILLIN SODIUM AND TAZOBACTAM SODIUM 3.38 G: 3; .375 INJECTION, SOLUTION INTRAVENOUS at 02:03

## 2024-05-12 ASSESSMENT — PAIN SCALES - GENERAL: PAINLEVEL_OUTOF10: 0 - NO PAIN

## 2024-05-12 ASSESSMENT — PAIN - FUNCTIONAL ASSESSMENT: PAIN_FUNCTIONAL_ASSESSMENT: 0-10

## 2024-05-12 NOTE — ED NOTES
PIV dcd intact; report given to physicians ambulance prior to discharge     Jeanine Gibbons RN  05/12/24 3389

## 2024-05-12 NOTE — DISCHARGE INSTRUCTIONS
You were seen in the ED after your heart rate was rapid. This improved after receiving adenosine by EMS and your heart rate has been stable here. This was likely SVT which you have had in the past. The rest of your workup was reassuring and we do not feel that you need admission at this time. Continue to monitor for heart rate changes or other symptoms.

## 2024-05-12 NOTE — PROGRESS NOTES
Patient arrived via EMS on facility vent with settings of 16/450/+5 with a 4 lpm  bleed in. Placed pt on 980 vent with same settings and .30 FIO2 with a saturation of 97%.

## 2024-05-12 NOTE — ED PROVIDER NOTES
CC: Rapid Heart Rate     HPI: Nedra Connelly is a 48-year-old female with history including hypertension, obesity, heart failure, SVT, CVA with residual left-sided weakness, trach and vent-dependent presenting to the ED from her nursing facility for rapid heart rate. EMS reported tachycardia for which she received adenosine 6mg and 12mg and converted to sinus rhythm. Exact rhythm not clear as the rhythm strip is post adenosine. The patient states that other than rapid heart rate she's not had any symptoms and feels fine.     Additional History Obtained from: EMR/paperwork from facility    PMHx/PSHx:  Per HPI.   - has no past medical history on file.  - has no past surgical history on file.    Social History:  - Tobacco:  has no history on file for tobacco use.   - Alcohol:  has no history on file for alcohol use.   - Drugs:  has no history on file for drug use.     Medications: Reviewed in EMR.     Allergies:  Bupropion    ???????????????????????????????????????????????????????????????  Triage Vitals:  T 37.1 °C (98.7 °F)  HR 90  BP (!) 144/91  RR 18  O2 97 % Supplemental oxygen    Physical Exam   Gen: awake, chronically ill-appearing but no distress  Eyes: no conjunctival injection or scleral icterus, pupils equal, extraocular movements grossly intact  ENT: nares patent, moist mucous membranes  Neck: supple, trachea midline, no masses, trach in place, no stridor, phonating appropriately   Heart: regular rate and rhythm, audible heart sounds  Lungs: clear to auscultation bilaterally, no increased work of breathing  Abdomen: soft, nondistended, nontender, no guarding or rebound  Extremities: well-perfused, left leg larger than right, no discoloration, mild tenderness to lower legs bilaterally (states is chronic), palpable pulses with intact cap refill  Neuro: alert, answers questions, no facial asymmetry, left-sided weakness    ???????????????????????????????????????????????????????????????  ED Course/Medical  Decision Makin-year-old female with history including hypertension, obesity, heart failure, SVT, CVA with residual left-sided weakness, trach and vent-dependent presenting to the ED from her nursing facility for rapid heart rate. Rate controlled and sinus rhythm on rhythm strip post adenosine. She was placed on cardiac monitoring. Is hemodynamically stable and has no symptoms at this time. See ED course for further information. Her workup was reassuring and she remains in sinus rhythm. Unclear rhythm prior to adenosine but presumably SVT given the sustained response. She is already in a care facility and can be closely monitored. She's asymptomatic so I do not feel that further testing or admission is indicated at this time. She will be discharged back to her facility with instruction to follow up with her doctor.   ED Course as of 24   Sat May 11, 2024   2350 Her labs show no notable electrolyte abnormalities. Troponin is stable per her baseline. Lactate is normal. Given leg asymmetry, a duplex was obtained and negative for DVT. I independently reviewed CXR and no acute process noted.  [LM]   Sun May 12, 2024   0038 EKG per my interpretation reveals normal sinus rhythm, rate 79, normal axis, normal intervals, no significant ST elevation/depression, T wave inversion in V3 through V6 with nonspecific T wave abnormality in limb leads. Compared to prior EKG, these T wave inversions were also present.  [LM]      ED Course User Index  [LM] Lucy Nicole MD         Diagnoses as of 24   Tachyarrhythmia       External records reviewed: recent inpatient, clinic, and prior ED notes  Diagnostic imaging independently reviewed/interpreted by me (as reflected in MDM) includes: labs, imaging, ekg  Social Determinants Affecting Care:  resides in LTAC  Prescription Drug Consideration: n/a  Escalation of Care: admission considered but not indicated    Disposition: discharge back to nursing  facility      Procedures ? Smart[employer] last updated 5/13/2024 10:47 PM        Lucy Nicole MD  05/13/24 0020

## 2024-05-12 NOTE — ED TRIAGE NOTES
Pt arrived to ED for rapid heart rate of 178 on EMS arrival to facility. EMS gave 6 and 12 of adenosine and has now NSR and HR of 95. Pt comes from SNF and has trach/vent baseline.

## 2024-05-15 LAB
ATRIAL RATE: 79 BPM
P AXIS: 64 DEGREES
P OFFSET: 195 MS
P ONSET: 124 MS
PR INTERVAL: 186 MS
Q ONSET: 217 MS
QRS COUNT: 13 BEATS
QRS DURATION: 90 MS
QT INTERVAL: 382 MS
QTC CALCULATION(BAZETT): 438 MS
QTC FREDERICIA: 418 MS
R AXIS: 50 DEGREES
T AXIS: 176 DEGREES
T OFFSET: 408 MS
VENTRICULAR RATE: 79 BPM